# Patient Record
Sex: MALE | Race: WHITE | Employment: FULL TIME | URBAN - METROPOLITAN AREA
[De-identification: names, ages, dates, MRNs, and addresses within clinical notes are randomized per-mention and may not be internally consistent; named-entity substitution may affect disease eponyms.]

---
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
## 2020-01-17 ENCOUNTER — OFFICE VISIT (OUTPATIENT)
Dept: FAMILY MEDICINE CLINIC | Age: 20
End: 2020-01-17
Payer: COMMERCIAL

## 2020-01-17 VITALS
HEIGHT: 68 IN | HEART RATE: 92 BPM | WEIGHT: 170 LBS | SYSTOLIC BLOOD PRESSURE: 116 MMHG | BODY MASS INDEX: 25.76 KG/M2 | DIASTOLIC BLOOD PRESSURE: 72 MMHG | OXYGEN SATURATION: 97 %

## 2020-01-17 PROCEDURE — 90686 IIV4 VACC NO PRSV 0.5 ML IM: CPT | Performed by: NURSE PRACTITIONER

## 2020-01-17 PROCEDURE — 1036F TOBACCO NON-USER: CPT | Performed by: NURSE PRACTITIONER

## 2020-01-17 PROCEDURE — G8427 DOCREV CUR MEDS BY ELIG CLIN: HCPCS | Performed by: NURSE PRACTITIONER

## 2020-01-17 PROCEDURE — 99213 OFFICE O/P EST LOW 20 MIN: CPT | Performed by: NURSE PRACTITIONER

## 2020-01-17 PROCEDURE — G8482 FLU IMMUNIZE ORDER/ADMIN: HCPCS | Performed by: NURSE PRACTITIONER

## 2020-01-17 PROCEDURE — G8419 CALC BMI OUT NRM PARAM NOF/U: HCPCS | Performed by: NURSE PRACTITIONER

## 2020-01-17 PROCEDURE — 90471 IMMUNIZATION ADMIN: CPT | Performed by: NURSE PRACTITIONER

## 2020-01-17 RX ORDER — ONDANSETRON 4 MG/1
4 TABLET, FILM COATED ORAL EVERY 8 HOURS PRN
Qty: 20 TABLET | Refills: 0 | Status: SHIPPED | OUTPATIENT
Start: 2020-01-17 | End: 2021-11-18 | Stop reason: ALTCHOICE

## 2020-01-17 ASSESSMENT — ENCOUNTER SYMPTOMS
CONSTIPATION: 0
NAUSEA: 1
VOMITING: 1
ABDOMINAL DISTENTION: 0
DIARRHEA: 0
SHORTNESS OF BREATH: 0

## 2020-01-17 ASSESSMENT — PATIENT HEALTH QUESTIONNAIRE - PHQ9
1. LITTLE INTEREST OR PLEASURE IN DOING THINGS: 0
SUM OF ALL RESPONSES TO PHQ QUESTIONS 1-9: 0
SUM OF ALL RESPONSES TO PHQ QUESTIONS 1-9: 0
SUM OF ALL RESPONSES TO PHQ9 QUESTIONS 1 & 2: 0
2. FEELING DOWN, DEPRESSED OR HOPELESS: 0

## 2020-01-17 NOTE — PATIENT INSTRUCTIONS
are likely to cause disease in the upcoming flu season. Even when the vaccine doesn't exactly match these viruses, it may still provide some protection. Influenza vaccine does not cause flu. Influenza vaccine may be given at the same time as other vaccines. Talk with your health care provider  Tell your vaccine provider if the person getting the vaccine:  · Has had an allergic reaction after a previous dose of influenza vaccine, or has any severe, life-threatening allergies. · Has ever had Guillain-Barré Syndrome (also called GBS). In some cases, your health care provider may decide to postpone influenza vaccination to a future visit. People with minor illnesses, such as a cold, may be vaccinated. People who are moderately or severely ill should usually wait until they recover before getting influenza vaccine. Your health care provider can give you more information. Risks of a vaccine reaction  · Soreness, redness, and swelling where shot is given, fever, muscle aches, and headache can happen after influenza vaccine. · There may be a very small increased risk of Guillain-Barré Syndrome (GBS) after inactivated influenza vaccine (the flu shot). [facility] children who get the flu shot along with pneumococcal vaccine (PCV13), and/or DTaP vaccine at the same time might be slightly more likely to have a seizure caused by fever. Tell your health care provider if a child who is getting flu vaccine has ever had a seizure. People sometimes faint after medical procedures, including vaccination. Tell your provider if you feel dizzy or have vision changes or ringing in the ears. As with any medicine, there is a very remote chance of a vaccine causing a severe allergic reaction, other serious injury, or death. What if there is a serious problem? An allergic reaction could occur after the vaccinated person leaves the clinic.  If you see signs of a severe allergic reaction (hives, swelling of the face and throat,

## 2020-01-17 NOTE — PROGRESS NOTES
Vaccine Information Sheet, \"Influenza - Inactivated\"  given to Gomez Guerrero, or parent/legal guardian of  Gomez Guerrero and verbalized understanding. Patient responses:    Have you ever had a reaction to a flu vaccine? No  Do you have any current illness? No  Have you ever had Guillian Orefield Syndrome? No  Do you have a serious allergy to any of the follow: Neomycin, Polymyxin, Thimerosal, eggs or egg products? No    Flu vaccine given per order. Please see immunization tab. Risks and benefits explained. Current VIS given.       Immunizations Administered     Name Date Dose Route    Influenza, Quadv, IM, PF (6 mo and older Fluzone, Flulaval, Fluarix, and 3 yrs and older Afluria) 1/17/2020 0.5 mL Intramuscular    Site: Deltoid- Left    Lot: [id number]    [address]: 72031-580-73
Mood and Affect: Mood normal.         Behavior: Behavior normal.         Thought Content: Thought content normal.         Judgment: Judgment normal.         ASSESSMENT/PLAN:    1. Immunization due  - INFLUENZA, QUADV, 3 YRS AND OLDER, IM PF, PREFILL SYR OR SDV, 0.5ML (AFLURIA QUADV, PF)    2. Nausea and vomiting, intractability of vomiting not specified, unspecified vomiting type  Diet as tolerated  Instructed in CL diet x 24 hours when vomiting occurs  - ondansetron (ZOFRAN) 4 MG tablet; Take 1 tablet by mouth every 8 hours as needed for Nausea or Vomiting  Dispense: 20 tablet; Refill: 0  Referral to:  - Vega Meraz CNP, Gastroenterology, [place]      Orders Placed This Encounter   Procedures    Alyx Samina, 3 YRS AND OLDER, IM PF, PREFILL SYR OR SDV, 0.5ML (Moon Cosdina, PF)   Hira Dyson CNP, Gastroenterology, [place]     Referral Priority:   Routine     Referral Type:   Eval and Treat     Referral Reason:   Specialty Services Required     Referred to Provider:   AKIL Flores CNP     Requested Specialty:   Nurse Practitioner Family     Number of Visits Requested:   1     Current Outpatient Medications   Medication Sig Dispense Refill    ondansetron (ZOFRAN) 4 MG tablet Take 1 tablet by mouth every 8 hours as needed for Nausea or Vomiting 20 tablet 0     No current facility-administered medications for this visit. Return if symptoms worsen or fail to improve. Jluis Armando DNP, FNP-C    Return for new or worsening symptoms or any concerns as needed.

## 2020-01-20 ENCOUNTER — OFFICE VISIT (OUTPATIENT)
Dept: GASTROENTEROLOGY | Age: 20
End: 2020-01-20
Payer: COMMERCIAL

## 2020-01-20 ENCOUNTER — PREP FOR PROCEDURE (OUTPATIENT)
Dept: GASTROENTEROLOGY | Age: 20
End: 2020-01-20

## 2020-01-20 VITALS
WEIGHT: 170 LBS | SYSTOLIC BLOOD PRESSURE: 120 MMHG | BODY MASS INDEX: 25.18 KG/M2 | HEART RATE: 83 BPM | HEIGHT: 69 IN | RESPIRATION RATE: 15 BRPM | DIASTOLIC BLOOD PRESSURE: 79 MMHG | OXYGEN SATURATION: 98 %

## 2020-01-20 PROCEDURE — 1036F TOBACCO NON-USER: CPT | Performed by: NURSE PRACTITIONER

## 2020-01-20 PROCEDURE — G8419 CALC BMI OUT NRM PARAM NOF/U: HCPCS | Performed by: NURSE PRACTITIONER

## 2020-01-20 PROCEDURE — 99204 OFFICE O/P NEW MOD 45 MIN: CPT | Performed by: NURSE PRACTITIONER

## 2020-01-20 PROCEDURE — G8482 FLU IMMUNIZE ORDER/ADMIN: HCPCS | Performed by: NURSE PRACTITIONER

## 2020-01-20 PROCEDURE — G8427 DOCREV CUR MEDS BY ELIG CLIN: HCPCS | Performed by: NURSE PRACTITIONER

## 2020-01-20 RX ORDER — SODIUM CHLORIDE, SODIUM LACTATE, POTASSIUM CHLORIDE, CALCIUM CHLORIDE 600; 310; 30; 20 MG/100ML; MG/100ML; MG/100ML; MG/100ML
INJECTION, SOLUTION INTRAVENOUS CONTINUOUS
Status: CANCELLED | OUTPATIENT
Start: 2020-01-20

## 2020-01-20 RX ORDER — SODIUM CHLORIDE 0.9 % (FLUSH) 0.9 %
10 SYRINGE (ML) INJECTION EVERY 12 HOURS SCHEDULED
Status: CANCELLED | OUTPATIENT
Start: 2020-01-20

## 2020-01-20 RX ORDER — SODIUM CHLORIDE 0.9 % (FLUSH) 0.9 %
10 SYRINGE (ML) INJECTION PRN
Status: CANCELLED | OUTPATIENT
Start: 2020-01-20

## 2020-01-20 ASSESSMENT — ENCOUNTER SYMPTOMS
SHORTNESS OF BREATH: 0
COUGH: 0
COLOR CHANGE: 0
DIARRHEA: 0
EYE PAIN: 0
VOMITING: 0
ABDOMINAL PAIN: 0
BLOOD IN STOOL: 0
WHEEZING: 0
BACK PAIN: 1
NAUSEA: 0
CONSTIPATION: 0

## 2020-01-20 NOTE — PROGRESS NOTES
Cornel Sim 23 y.o. male was seen by BROOKE Coleman on 01/20/20     Wt Readings from Last 3 Encounters:   01/20/20 170 lb (77.1 kg) (72 %, Z= 0.58)*   01/17/20 170 lb (77.1 kg) (72 %, Z= 0.58)*   11/21/19 168 lb (76.2 kg) (70 %, Z= 0.54)*     * Growth percentiles are based on [facility] (Boys, 2-20 Years) data. HPI  Cornel Sim is a pleasant 23 y.o.  male who presents today with his girlfriend for cyclical nausea and vomiting. He has a past medical history of ADHD (attention deficit hyperactivity disorder) and HSV (herpes simplex virus) anogenital infection. He smokes marijuana daily to a couple times daily smoking for the last two years. Infrequent NSAID use. He has taken Acyclovir a couple times in the last year for treatment of herpes. His nausea started roughly two years ago. He denies fever, sick contacts or food allergies/intolerances. His appetite is fair without early satiety. He typically eats once a day. His nausea has no set pattern but occurs roughly once a month to month and a half. He reported last episode occurred four days ago waking up and burping what smelled like rotten eggs. He preceded to have nausea with bile emesis that lasted 24 hours. He had multiple vomiting episodes that occurred on that day. Zofran helped. No certain foods worsen, although girl friend mentioned drinking orange juice made it worse. His appetite is fair without early satiety. His weight is stable. He eats one to two meals daily typically fruits/vegetables and fast food. No abdominal pain, bloating or distention. No current nausea or vomiting. He denies heartburn or acid reflux. No nocturnal awakenings with acid reflux. No dysphagia or pain with swallowing. He has excess belching. No excess flatulence. His typical bowel pattern is every other day with soft brown formed stools. No diarrhea or constipation. No blood in his stools or black tarry stools.   No family history of stomach or colon cancer. ROS  Review of Systems   Constitutional: Negative for appetite change, chills, fatigue, fever and unexpected weight change. HENT: Negative for ear pain, hearing loss and tinnitus. Eyes: Negative for pain and visual disturbance. Respiratory: Negative for cough, shortness of breath and wheezing. Cardiovascular: Negative for chest pain, palpitations and leg swelling. Gastrointestinal: Negative for abdominal pain, blood in stool, constipation, diarrhea, nausea and vomiting. Endocrine: Negative for cold intolerance and heat intolerance. Genitourinary: Negative for dysuria, frequency and urgency. Musculoskeletal: Positive for back pain and myalgias. Negative for neck pain. Skin: Negative for color change, pallor and rash. Allergic/Immunologic: Negative for environmental allergies and food allergies. Neurological: Negative for dizziness, seizures and headaches. Hematological: Does not bruise/bleed easily. Psychiatric/Behavioral: Positive for sleep disturbance. Negative for dysphoric mood and suicidal ideas. The patient is not nervous/anxious. Allergies  No Known Allergies    Medications  Current Outpatient Medications   Medication Sig Dispense Refill    CLONIDINE HCL ER PO Take 0.5 mg by mouth      ondansetron (ZOFRAN) 4 MG tablet Take 1 tablet by mouth every 8 hours as needed for Nausea or Vomiting 20 tablet 0     No current facility-administered medications for this visit. Past medical history:   He has a past medical history of ADHD (attention deficit hyperactivity disorder) and HSV (herpes simplex virus) anogenital infection. Past surgical history:  He has no past surgical history on file. Social History:  He reports that he has never smoked. He has never used smokeless tobacco. He reports current drug use. Frequency: 7.00 times per week. Drug: Marijuana. He reports that he does not drink alcohol.     Family history:  His family history Mental Status: He is alert and oriented to person, place, and time. Psychiatric:         Mood and Affect: Mood normal.         No visits with results within 2 Month(s) from this visit. Latest known visit with results is:   Office Visit on 03/28/2019   Component Date Value Ref Range Status    Sodium 03/28/2019 141  136 - 145 mmol/L Final    Potassium 03/28/2019 4.4  3.5 - 5.1 mmol/L Final    Chloride 03/28/2019 103  99 - 110 mmol/L Final    CO2 03/28/2019 24  21 - 32 mmol/L Final    Anion Gap 03/28/2019 14  3 - 16 Final    Glucose 03/28/2019 96  70 - 99 mg/dL Final    BUN 03/28/2019 15  7 - 20 mg/dL Final    CREATININE 03/28/2019 0.8* 0.9 - 1.3 mg/dL Final    GFR Non- 03/28/2019 >60  >60 Final    Comment: >60 mL/min/1.73m2 EGFR, calc. for ages 25 and older using the  MDRD formula (not corrected for weight), is valid for stable  renal function.  GFR  03/28/2019 >60  >60 Final    Comment: Chronic Kidney Disease: less than 60 ml/min/1.73 sq.m. Kidney Failure: less than 15 ml/min/1.73 sq.m. Results valid for patients 18 years and older.       Calcium 03/28/2019 9.8  8.3 - 10.6 mg/dL Final    Total Protein 03/28/2019 7.9  6.4 - 8.2 g/dL Final    Alb 03/28/2019 5.0  3.4 - 5.0 g/dL Final    Albumin/Globulin Ratio 03/28/2019 1.7  1.1 - 2.2 Final    Total Bilirubin 03/28/2019 0.4  0.0 - 1.0 mg/dL Final    Alkaline Phosphatase 03/28/2019 88  40 - 129 U/L Final    ALT 03/28/2019 14  10 - 40 U/L Final    AST 03/28/2019 16  15 - 37 U/L Final    Globulin 03/28/2019 2.9  g/dL Final    WBC 03/28/2019 8.8  4.0 - 11.0 K/uL Final    RBC 03/28/2019 5.34  4.20 - 5.90 M/uL Final    Hemoglobin 03/28/2019 16.6  13.5 - 17.5 g/dL Final    Hematocrit 03/28/2019 49.6  40.5 - 52.5 % Final    MCV 03/28/2019 92.9  80.0 - 100.0 fL Final    MCH 03/28/2019 31.0  26.0 - 34.0 pg Final    MCHC 03/28/2019 33.4  31.0 - 36.0 g/dL Final    RDW 03/28/2019 12.5  12.4 - 15.4 % Final

## 2020-02-25 ENCOUNTER — TELEPHONE (OUTPATIENT)
Dept: FAMILY MEDICINE CLINIC | Age: 20
End: 2020-02-25

## 2020-03-02 ENCOUNTER — TELEPHONE (OUTPATIENT)
Dept: GASTROENTEROLOGY | Age: 20
End: 2020-03-02

## 2021-11-18 ENCOUNTER — HOSPITAL ENCOUNTER (EMERGENCY)
Age: 21
Discharge: HOME | End: 2021-11-18
Attending: EMERGENCY MEDICINE
Payer: COMMERCIAL

## 2021-11-18 VITALS
HEIGHT: 69 IN | HEART RATE: 59 BPM | BODY MASS INDEX: 27.7 KG/M2 | DIASTOLIC BLOOD PRESSURE: 88 MMHG | SYSTOLIC BLOOD PRESSURE: 163 MMHG | WEIGHT: 187 LBS | TEMPERATURE: 97.8 F | RESPIRATION RATE: 14 BRPM | OXYGEN SATURATION: 100 %

## 2021-11-18 DIAGNOSIS — F12.90 MARIJUANA USE: ICD-10-CM

## 2021-11-18 DIAGNOSIS — R11.2 NON-INTRACTABLE VOMITING WITH NAUSEA, UNSPECIFIED VOMITING TYPE: Primary | ICD-10-CM

## 2021-11-18 LAB
ALBUMIN SERPL-MCNC: 5.6 GM/DL (ref 3.4–5)
ALP BLD-CCNC: 122 IU/L (ref 40–129)
ALT SERPL-CCNC: 28 U/L (ref 10–40)
ANION GAP SERPL CALCULATED.3IONS-SCNC: 23 MMOL/L (ref 4–16)
AST SERPL-CCNC: 21 IU/L (ref 15–37)
BACTERIA: NEGATIVE /HPF
BASOPHILS ABSOLUTE: 0 K/CU MM
BASOPHILS RELATIVE PERCENT: 0.3 % (ref 0–1)
BILIRUB SERPL-MCNC: 0.6 MG/DL (ref 0–1)
BILIRUBIN URINE: NEGATIVE MG/DL
BLOOD, URINE: ABNORMAL
BUN BLDV-MCNC: 20 MG/DL (ref 6–23)
CALCIUM SERPL-MCNC: 10.8 MG/DL (ref 8.3–10.6)
CAST TYPE: ABNORMAL /HPF
CHLORIDE BLD-SCNC: 96 MMOL/L (ref 99–110)
CLARITY: CLEAR
CO2: 20 MMOL/L (ref 21–32)
COLOR: YELLOW
COMMENT UA: ABNORMAL
CREAT SERPL-MCNC: 0.9 MG/DL (ref 0.9–1.3)
CRYSTAL TYPE: NEGATIVE /HPF
DIFFERENTIAL TYPE: ABNORMAL
EOSINOPHILS ABSOLUTE: 0 K/CU MM
EOSINOPHILS RELATIVE PERCENT: 0 % (ref 0–3)
EPITHELIAL CELLS, UA: 0 /HPF
GFR AFRICAN AMERICAN: >60 ML/MIN/1.73M2
GFR NON-AFRICAN AMERICAN: >60 ML/MIN/1.73M2
GLUCOSE BLD-MCNC: 100 MG/DL (ref 70–99)
GLUCOSE, URINE: NEGATIVE MG/DL
HCT VFR BLD CALC: 52.4 % (ref 42–52)
HEMOGLOBIN: 17.7 GM/DL (ref 13.5–18)
IMMATURE NEUTROPHIL %: 0.4 % (ref 0–0.43)
KETONES, URINE: 160 MG/DL
LEUKOCYTE ESTERASE, URINE: NEGATIVE
LIPASE: 22 IU/L (ref 13–60)
LYMPHOCYTES ABSOLUTE: 0.7 K/CU MM
LYMPHOCYTES RELATIVE PERCENT: 5.5 % (ref 24–44)
MCH RBC QN AUTO: 30.7 PG (ref 27–31)
MCHC RBC AUTO-ENTMCNC: 33.8 % (ref 32–36)
MCV RBC AUTO: 90.8 FL (ref 78–100)
MONOCYTES ABSOLUTE: 0.3 K/CU MM
MONOCYTES RELATIVE PERCENT: 2.1 % (ref 0–4)
NITRITE URINE, QUANTITATIVE: NEGATIVE
PDW BLD-RTO: 11.8 % (ref 11.7–14.9)
PH, URINE: 5 (ref 5–8)
PLATELET # BLD: 312 K/CU MM (ref 140–440)
PMV BLD AUTO: 11 FL (ref 7.5–11.1)
POTASSIUM SERPL-SCNC: 4.9 MMOL/L (ref 3.5–5.1)
PROTEIN UA: 300 MG/DL
RBC # BLD: 5.77 M/CU MM (ref 4.6–6.2)
RBC URINE: 1 /HPF (ref 0–3)
SEGMENTED NEUTROPHILS ABSOLUTE COUNT: 11.3 K/CU MM
SEGMENTED NEUTROPHILS RELATIVE PERCENT: 91.7 % (ref 36–66)
SODIUM BLD-SCNC: 139 MMOL/L (ref 135–145)
SPECIFIC GRAVITY UA: 1.03 (ref 1–1.03)
TOTAL IMMATURE NEUTOROPHIL: 0.05 K/CU MM
TOTAL PROTEIN: 8.8 GM/DL (ref 6.4–8.2)
UROBILINOGEN, URINE: 0.2 MG/DL (ref 0.2–1)
WBC # BLD: 12.4 K/CU MM (ref 4–10.5)
WBC UA: <0 /HPF (ref 0–2)

## 2021-11-18 PROCEDURE — 2500000003 HC RX 250 WO HCPCS: Performed by: EMERGENCY MEDICINE

## 2021-11-18 PROCEDURE — 81001 URINALYSIS AUTO W/SCOPE: CPT

## 2021-11-18 PROCEDURE — 96374 THER/PROPH/DIAG INJ IV PUSH: CPT

## 2021-11-18 PROCEDURE — 6360000002 HC RX W HCPCS: Performed by: EMERGENCY MEDICINE

## 2021-11-18 PROCEDURE — 99285 EMERGENCY DEPT VISIT HI MDM: CPT

## 2021-11-18 PROCEDURE — 85025 COMPLETE CBC W/AUTO DIFF WBC: CPT

## 2021-11-18 PROCEDURE — 2580000003 HC RX 258: Performed by: EMERGENCY MEDICINE

## 2021-11-18 PROCEDURE — 96361 HYDRATE IV INFUSION ADD-ON: CPT

## 2021-11-18 PROCEDURE — 83690 ASSAY OF LIPASE: CPT

## 2021-11-18 PROCEDURE — 80053 COMPREHEN METABOLIC PANEL: CPT

## 2021-11-18 PROCEDURE — 96375 TX/PRO/DX INJ NEW DRUG ADDON: CPT

## 2021-11-18 RX ORDER — ONDANSETRON 2 MG/ML
4 INJECTION INTRAMUSCULAR; INTRAVENOUS EVERY 30 MIN PRN
Status: DISCONTINUED | OUTPATIENT
Start: 2021-11-18 | End: 2021-11-18 | Stop reason: HOSPADM

## 2021-11-18 RX ORDER — ONDANSETRON 4 MG/1
4 TABLET, FILM COATED ORAL EVERY 8 HOURS PRN
Qty: 20 TABLET | Refills: 0 | Status: SHIPPED | OUTPATIENT
Start: 2021-11-18

## 2021-11-18 RX ORDER — 0.9 % SODIUM CHLORIDE 0.9 %
1000 INTRAVENOUS SOLUTION INTRAVENOUS ONCE
Status: COMPLETED | OUTPATIENT
Start: 2021-11-18 | End: 2021-11-18

## 2021-11-18 RX ADMIN — FAMOTIDINE 20 MG: 10 INJECTION, SOLUTION INTRAVENOUS at 09:34

## 2021-11-18 RX ADMIN — ONDANSETRON 4 MG: 2 INJECTION INTRAMUSCULAR; INTRAVENOUS at 09:34

## 2021-11-18 RX ADMIN — SODIUM CHLORIDE 1000 ML: 9 INJECTION, SOLUTION INTRAVENOUS at 09:33

## 2021-11-18 NOTE — ED PROVIDER NOTES
Emergency Department Encounter  [address]    Patient: Jorje Choudhary  MRN: [id number]  : 2000  Date of Evaluation: 2021  ED Provider: Eli Stevens MD    Chief Complaint       Chief Complaint   Patient presents with   Corea Tony    Emesis     Sneha Palmer is a 24 y.o. male who presents to the emergency department with report of nausea and vomiting since 10:30 PM.  He has not had diarrhea, fever, or abdominal pain. He reports fatigue. He admits to smoking marijuana since he was 12years old. He states he smokes about 2-3 times daily. He denies any dizziness. ROS:     At least 10 systems reviewed and otherwise acutely negative except as in the [address] Past History     Past Medical History:   Diagnosis Date    ADHD (attention deficit hyperactivity disorder)     HSV (herpes simplex virus) anogenital infection      History reviewed. No pertinent surgical history.   Social History     Socioeconomic History    Marital status: Single     Spouse name: None    Number of children: 0    Years of education: 6    Highest education level: None   Occupational History    Occupation: unemployed     Comment: looking for work   Tobacco Use    Smoking status: Never Smoker    Smokeless tobacco: Never Used   Vaping Use    Vaping Use: Former   Substance and Sexual Activity    Alcohol use: No     Comment: patient sates \"A lot of caffeine\"    Drug use: Yes     Frequency: 7.0 times per week     Types: Marijuana (Weed)     Comment: smokes 1-2 daily to help with RLS    Sexual activity: Yes     Partners: Female     Birth control/protection: Condom   Other Topics Concern    None   Social History Narrative    Lives with mom and step dad    Trying to get a job     Social Determinants of Health     Financial Resource Strain:     Difficulty of Paying Living Expenses: Not on file   Food Insecurity:     Worried About [address] in the Last Year: Not on file    Ran Out of Food in the Last Year: Not on file   Transportation Needs:     Lack of Transportation (Medical): Not on file    Lack of Transportation (Non-Medical): Not on file   Physical Activity:     Days of Exercise per Week: Not on file    Minutes of Exercise per Session: Not on file   Stress:     Feeling of Stress : Not on file   Social Connections:     Frequency of Communication with Friends and Family: Not on file    Frequency of Social Gatherings with Friends and Family: Not on file    Attends Church Services: Not on file    Active Member of [address] [place] [address] [employer] or Organizations: Not on file    Attends Club or Organization Meetings: Not on file    Marital Status: Not on file   Intimate Partner Violence:     Fear of Current or Ex-Partner: Not on file    Emotionally Abused: Not on file    Physically Abused: Not on file    Sexually Abused: Not on file   Housing Stability:     Unable to Pay for Housing in the Last Year: Not on file    Number of [place] in the Last Year: Not on file    Unstable Housing in the Last Year: Not on file       Medications/Allergies     Discharge Medication List as of 11/18/2021 11:19 AM        No Known Allergies     Physical Exam       ED Triage Vitals [11/18/21 0920]   BP Temp Temp Source Pulse Resp SpO2 Height Weight   (!) 163/88 97.8 °F (36.6 °C) Oral 59 14 100 % 5' 9\" (1.753 m) 187 lb (84.8 kg)     GENERAL APPEARANCE: Awake and alert. Cooperative. No acute distress. Obvious discomfort. HEAD: Normocephalic. Atraumatic. EYES: Sclera anicteric. PERRL. EOMI.  ENT: Tolerates saliva. No trismus. NECK: Supple. Trachea midline. CARDIO: RRR. Radial pulse 2+. No audible murmur. LUNGS: Respirations unlabored. CTAB and symmetrical fair to good air movement. .  ABDOMEN: Soft. Non-distended. Non-tender. Normoactive bowel sounds. EXTREMITIES: No acute deformities. No clubbing, edema, cyanosis. Distal pulses are intact and symmetrical.  Capillary bed refill is brisk.   SKIN: Warm and dry. VS lesions or discolorations. NEUROLOGICAL: No gross facial drooping. Moves all 4 extremities spontaneously. Awake and alert. Speech is clear. No gross motor or sensory deficits. Cranial nerves III to XII grossly intact. PSYCHIATRIC: Normal mood. Diagnostics   Labs:  Results for orders placed or performed during the hospital encounter of 11/18/21   Comprehensive Metabolic Panel w/ Reflex to MG   Result Value Ref Range    Sodium 139 135 - 145 MMOL/L    Potassium 4.9 3.5 - 5.1 MMOL/L    Chloride 96 (L) 99 - 110 mMol/L    CO2 20 (L) 21 - 32 MMOL/L    BUN 20 6 - 23 MG/DL    CREATININE 0.9 0.9 - 1.3 MG/DL    Glucose 100 (H) 70 - 99 MG/DL    Calcium 10.8 (H) 8.3 - 10.6 MG/DL    Albumin 5.6 (H) 3.4 - 5.0 GM/DL    Total Protein 8.8 (H) 6.4 - 8.2 GM/DL    Total Bilirubin 0.6 0.0 - 1.0 MG/DL    ALT 28 10 - 40 U/L    AST 21 15 - 37 IU/L    Alkaline Phosphatase 122 40 - 129 IU/L    GFR Non-African American >60 >60 mL/min/1.73m2    GFR African American >60 >60 mL/min/1.73m2    Anion Gap 23 (H) 4 - 16   Lipase   Result Value Ref Range    Lipase 22 13 - 60 IU/L   CBC Auto Differential   Result Value Ref Range    WBC 12.4 (H) 4.0 - 10.5 K/CU MM    RBC 5.77 4.6 - 6.2 M/CU MM    Hemoglobin 17.7 13.5 - 18.0 GM/DL    Hematocrit 52.4 (H) 42 - 52 %    MCV 90.8 78 - 100 FL    MCH 30.7 27 - 31 PG    MCHC 33.8 32.0 - 36.0 %    RDW 11.8 11.7 - 14.9 %    Platelets 453 644 - 136 K/CU MM    MPV 11.0 7.5 - 11.1 FL    Differential Type AUTOMATED DIFFERENTIAL     Segs Relative 91.7 (H) 36 - 66 %    Lymphocytes % 5.5 (L) 24 - 44 %    Monocytes % 2.1 0 - 4 %    Eosinophils % 0.0 0 - 3 %    Basophils % 0.3 0 - 1 %    Segs Absolute 11.3 K/CU MM    Lymphocytes Absolute 0.7 K/CU MM    Monocytes Absolute 0.3 K/CU MM    Eosinophils Absolute 0.0 K/CU MM    Basophils Absolute 0.0 K/CU MM    Immature Neutrophil % 0.4 0 - 0.43 %    Total Immature Neutrophil 0.05 K/CU MM     Radiographs:  No results found.     Procedures/EKG:   None    ED Course and MDM   In brief, Jessica Solis is a 24 y.o. male who presented to the emergency department with reported nausea and vomiting since last night. The patient admits to smoking marijuana 2-3 times daily. The vomiting may be related to the frequent use of the marijuana with. The patient is encouraged to discontinue or at least decrease the use of marijuana. He received IV fluid hydration as well as antiemetic in the emergency department with improvement of his symptoms and requested to be discharged. The CBC and CMP were unremarkable. His urinalysis was pending. Patient reported to nursing staff he needed to  his daughter from school. Patient will be discharged. He is to continue with hydration at home. He will have a prescription for Zofran. At time of disposition, he is stable and in no acute distress or obvious discomfort. He is encouraged to follow-up with primary care provider as well as return to emergency department for any worsening or concerning symptoms. ED Medication Orders (From admission, onward)    Start Ordered     Status Ordering Provider    11/18/21 0930 11/18/21 0915  0.9 % sodium chloride bolus  ONCE         Last MAR action: Stopped - by Ainsley RODRÍGUEZ on 11/18/21 at [id number], MELISSA M    11/18/21 0930 11/18/21 0915  famotidine (PEPCID) injection 20 mg  ONCE         Last MAR action: Given - by Ainsley RODRÍGUEZ on 11/18/21 at 0934 Reina Bustillo     11/18/21 0915 11/18/21 0915  ondansetron (ZOFRAN) injection 4 mg  EVERY 30 MIN PRN         Last MAR action: Given - by Ainsley RODRÍGUEZ on 11/18/21 at 0934 Sushila Zheng          Final Impression      1. Non-intractable vomiting with nausea, unspecified vomiting type    2.  Marijuana use      DISPOSITION: Discharge     (Please note that portions of this note may have been completed with a voice recognition program. Efforts were made to edit the dictations but occasionally words are mis-transcribed.)    Simon Monroe, MD  [facility], MD  11/18/21 1284

## 2021-11-18 NOTE — ED TRIAGE NOTES
Pt arrived via triage with c/o vomiting and chills since last night. Pt is alert, oriented and ambulatory. Pt denies taking medications. Pt denies sick contacts. Pt denies pain.

## 2021-11-18 NOTE — ED NOTES
Discharge instructions given with prescription verbalized understanding pt is ambulatory out       Reyna Mercado RN  11/18/21 [id number]

## 2022-03-30 ENCOUNTER — APPOINTMENT (OUTPATIENT)
Dept: CT IMAGING | Age: 22
End: 2022-03-30
Payer: COMMERCIAL

## 2022-03-30 ENCOUNTER — HOSPITAL ENCOUNTER (EMERGENCY)
Age: 22
Discharge: ANOTHER HOSPITAL | End: 2022-03-30
Attending: EMERGENCY MEDICINE
Payer: COMMERCIAL

## 2022-03-30 ENCOUNTER — APPOINTMENT (OUTPATIENT)
Dept: GENERAL RADIOLOGY | Age: 22
End: 2022-03-30
Payer: COMMERCIAL

## 2022-03-30 VITALS
OXYGEN SATURATION: 97 % | DIASTOLIC BLOOD PRESSURE: 86 MMHG | TEMPERATURE: 98 F | SYSTOLIC BLOOD PRESSURE: 145 MMHG | HEART RATE: 64 BPM | RESPIRATION RATE: 20 BRPM
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
## 2022-03-30 DIAGNOSIS — S02.92XA CLOSED FRACTURE OF FACIAL BONE DUE TO MOTOR VEHICLE ACCIDENT, INITIAL ENCOUNTER (HCC): ICD-10-CM
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
## 2022-03-30 DIAGNOSIS — I60.9 SUBARACHNOID HEMORRHAGE (HCC): ICD-10-CM

## 2022-03-30 LAB
ALBUMIN SERPL-MCNC: 5 GM/DL (ref 3.4–5)
ALCOHOL SCREEN SERUM: <0.01 %WT/VOL
ALP BLD-CCNC: 114 IU/L (ref 40–129)
ALT SERPL-CCNC: 20 U/L (ref 10–40)
ANION GAP SERPL CALCULATED.3IONS-SCNC: 16 MMOL/L (ref 4–16)
AST SERPL-CCNC: 22 IU/L (ref 15–37)
BASOPHILS ABSOLUTE: 0.1 K/CU MM
BASOPHILS RELATIVE PERCENT: 0.3 % (ref 0–1)
BILIRUB SERPL-MCNC: 0.3 MG/DL (ref 0–1)
BUN BLDV-MCNC: 14 MG/DL (ref 6–23)
CALCIUM SERPL-MCNC: 9.1 MG/DL (ref 8.3–10.6)
CHLORIDE BLD-SCNC: 98 MMOL/L (ref 99–110)
CO2: 25 MMOL/L (ref 21–32)
CREAT SERPL-MCNC: 0.9 MG/DL (ref 0.9–1.3)
DIFFERENTIAL TYPE: ABNORMAL
EOSINOPHILS ABSOLUTE: 0.3 K/CU MM
EOSINOPHILS RELATIVE PERCENT: 1.2 % (ref 0–3)
GFR AFRICAN AMERICAN: >60 ML/MIN/1.73M2
GFR NON-AFRICAN AMERICAN: >60 ML/MIN/1.73M2
GLUCOSE BLD-MCNC: 119 MG/DL (ref 70–99)
HCT VFR BLD CALC: 48.6 % (ref 42–52)
HEMOGLOBIN: 16.6 GM/DL (ref 13.5–18)
IMMATURE NEUTROPHIL %: 0.5 % (ref 0–0.43)
LYMPHOCYTES ABSOLUTE: 2 K/CU MM
LYMPHOCYTES RELATIVE PERCENT: 8.6 % (ref 24–44)
MAGNESIUM: 2.2 MG/DL (ref 1.8–2.4)
MCH RBC QN AUTO: 30.9 PG (ref 27–31)
MCHC RBC AUTO-ENTMCNC: 34.2 % (ref 32–36)
MCV RBC AUTO: 90.5 FL (ref 78–100)
MONOCYTES ABSOLUTE: 1.5 K/CU MM
MONOCYTES RELATIVE PERCENT: 6.7 % (ref 0–4)
NUCLEATED RBC %: 0 %
PDW BLD-RTO: 12.1 % (ref 11.7–14.9)
PLATELET # BLD: 330 K/CU MM (ref 140–440)
PMV BLD AUTO: 8.9 FL (ref 7.5–11.1)
POTASSIUM SERPL-SCNC: 3.3 MMOL/L (ref 3.5–5.1)
RBC # BLD: 5.37 M/CU MM (ref 4.6–6.2)
SEGMENTED NEUTROPHILS ABSOLUTE COUNT: 18.7 K/CU MM
SEGMENTED NEUTROPHILS RELATIVE PERCENT: 82.7 % (ref 36–66)
SODIUM BLD-SCNC: 139 MMOL/L (ref 135–145)
TOTAL IMMATURE NEUTOROPHIL: 0.12 K/CU MM
TOTAL NUCLEATED RBC: 0 K/CU MM
TOTAL PROTEIN: 8.3 GM/DL (ref 6.4–8.2)
WBC # BLD: 22.6 K/CU MM (ref 4–10.5)

## 2022-03-30 PROCEDURE — 70486 CT MAXILLOFACIAL W/O DYE: CPT

## 2022-03-30 PROCEDURE — G0480 DRUG TEST DEF 1-7 CLASSES: HCPCS

## 2022-03-30 PROCEDURE — 96374 THER/PROPH/DIAG INJ IV PUSH: CPT

## 2022-03-30 PROCEDURE — 90715 TDAP VACCINE 7 YRS/> IM: CPT | Performed by: EMERGENCY MEDICINE

## 2022-03-30 PROCEDURE — 96372 THER/PROPH/DIAG INJ SC/IM: CPT

## 2022-03-30 PROCEDURE — 80053 COMPREHEN METABOLIC PANEL: CPT

## 2022-03-30 PROCEDURE — 85025 COMPLETE CBC W/AUTO DIFF WBC: CPT

## 2022-03-30 PROCEDURE — 6360000002 HC RX W HCPCS: Performed by: EMERGENCY MEDICINE

## 2022-03-30 PROCEDURE — 71045 X-RAY EXAM CHEST 1 VIEW: CPT

## 2022-03-30 PROCEDURE — 70450 CT HEAD/BRAIN W/O DYE: CPT

## 2022-03-30 PROCEDURE — 6370000000 HC RX 637 (ALT 250 FOR IP): Performed by: EMERGENCY MEDICINE

## 2022-03-30 PROCEDURE — 90471 IMMUNIZATION ADMIN: CPT | Performed by: EMERGENCY MEDICINE

## 2022-03-30 PROCEDURE — 72125 CT NECK SPINE W/O DYE: CPT

## 2022-03-30 PROCEDURE — 72170 X-RAY EXAM OF PELVIS: CPT

## 2022-03-30 PROCEDURE — 83735 ASSAY OF MAGNESIUM: CPT

## 2022-03-30 PROCEDURE — 99285 EMERGENCY DEPT VISIT HI MDM: CPT

## 2022-03-30 RX ORDER — ACETAMINOPHEN 500 MG
1000 TABLET ORAL ONCE
Status: COMPLETED | OUTPATIENT
Start: 2022-03-30 | End: 2022-03-30

## 2022-03-30 RX ORDER — FENTANYL CITRATE 50 UG/ML
50 INJECTION, SOLUTION INTRAMUSCULAR; INTRAVENOUS ONCE
Status: COMPLETED | OUTPATIENT
Start: 2022-03-30 | End: 2022-03-30

## 2022-03-30 RX ORDER — LIDOCAINE HYDROCHLORIDE 10 MG/ML
5 INJECTION, SOLUTION EPIDURAL; INFILTRATION; INTRACAUDAL; PERINEURAL ONCE
Status: DISCONTINUED | OUTPATIENT
Start: 2022-03-30 | End: 2022-03-30 | Stop reason: HOSPADM

## 2022-03-30 RX ORDER — OXYMETAZOLINE HYDROCHLORIDE 0.05 G/100ML
2 SPRAY NASAL ONCE
Status: COMPLETED | OUTPATIENT
Start: 2022-03-30 | End: 2022-03-30

## 2022-03-30 RX ADMIN — OXYMETAZOLINE HCL 2 SPRAY: 0.05 SPRAY NASAL at 03:30

## 2022-03-30 RX ADMIN — ACETAMINOPHEN 1000 MG: 500 TABLET ORAL at 03:30

## 2022-03-30 RX ADMIN — FENTANYL CITRATE 50 MCG: 50 INJECTION INTRAMUSCULAR; INTRAVENOUS at 05:43

## 2022-03-30 RX ADMIN — TETANUS TOXOID, REDUCED DIPHTHERIA TOXOID AND ACELLULAR PERTUSSIS VACCINE, ADSORBED 0.5 ML: 5; 2.5; 8; 8; 2.5 SUSPENSION INTRAMUSCULAR at 03:31

## 2022-03-30 ASSESSMENT — ENCOUNTER SYMPTOMS
SHORTNESS OF BREATH: 0
SORE THROAT: 0
ABDOMINAL PAIN: 0
RHINORRHEA: 0
VOMITING: 0
NAUSEA: 0
BACK PAIN: 0
EYE PAIN: 0
COUGH: 0
EYE DISCHARGE: 0

## 2022-03-30 ASSESSMENT — PAIN SCALES - GENERAL
PAINLEVEL_OUTOF10: 4
PAINLEVEL_OUTOF10: 7
PAINLEVEL_OUTOF10: 7
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
## 2022-07-19 ENCOUNTER — HOSPITAL ENCOUNTER (EMERGENCY)
Age: 22
Discharge: HOME | End: 2022-07-19
Attending: STUDENT IN AN ORGANIZED HEALTH CARE EDUCATION/TRAINING PROGRAM
Payer: COMMERCIAL

## 2022-07-19 VITALS
DIASTOLIC BLOOD PRESSURE: 62 MMHG | TEMPERATURE: 98.3 F | HEART RATE: 95 BPM | SYSTOLIC BLOOD PRESSURE: 113 MMHG | OXYGEN SATURATION: 96 % | HEIGHT: 69 IN | RESPIRATION RATE: 18 BRPM | BODY MASS INDEX: 26.66 KG/M2 | WEIGHT: 180 LBS

## 2022-07-19 DIAGNOSIS — F43.22 ADJUSTMENT DISORDER WITH ANXIOUS MOOD: Primary | ICD-10-CM

## 2022-07-19 LAB
ACETAMINOPHEN LEVEL: <5 UG/ML (ref 15–30)
ALCOHOL SCREEN SERUM: <0.01 %WT/VOL
AMPHETAMINES: NEGATIVE
ANION GAP SERPL CALCULATED.3IONS-SCNC: 13 MMOL/L (ref 4–16)
BACTERIA: NEGATIVE /HPF
BARBITURATE SCREEN URINE: NEGATIVE
BASOPHILS ABSOLUTE: 0 K/CU MM
BASOPHILS RELATIVE PERCENT: 0.2 % (ref 0–1)
BENZODIAZEPINE SCREEN, URINE: NEGATIVE
BILIRUBIN URINE: NEGATIVE MG/DL
BLOOD, URINE: NEGATIVE
BUN BLDV-MCNC: 15 MG/DL (ref 6–23)
CALCIUM SERPL-MCNC: 9.9 MG/DL (ref 8.3–10.6)
CANNABINOID SCREEN URINE: ABNORMAL
CHLORIDE BLD-SCNC: 106 MMOL/L (ref 99–110)
CLARITY: CLEAR
CO2: 23 MMOL/L (ref 21–32)
COCAINE METABOLITE: NEGATIVE
COLOR: YELLOW
CREAT SERPL-MCNC: 0.7 MG/DL (ref 0.9–1.3)
DIFFERENTIAL TYPE: ABNORMAL
DOSE AMOUNT: ABNORMAL
DOSE AMOUNT: ABNORMAL
DOSE TIME: ABNORMAL
DOSE TIME: ABNORMAL
EOSINOPHILS ABSOLUTE: 0 K/CU MM
EOSINOPHILS RELATIVE PERCENT: 0.1 % (ref 0–3)
GFR AFRICAN AMERICAN: >60 ML/MIN/1.73M2
GFR NON-AFRICAN AMERICAN: >60 ML/MIN/1.73M2
GLUCOSE BLD-MCNC: 103 MG/DL (ref 70–99)
GLUCOSE, URINE: NEGATIVE MG/DL
HCT VFR BLD CALC: 45.7 % (ref 42–52)
HEMOGLOBIN: 15.9 GM/DL (ref 13.5–18)
IMMATURE NEUTROPHIL %: 0.2 % (ref 0–0.43)
KETONES, URINE: ABNORMAL MG/DL
LEUKOCYTE ESTERASE, URINE: NEGATIVE
LYMPHOCYTES ABSOLUTE: 1.4 K/CU MM
LYMPHOCYTES RELATIVE PERCENT: 14.6 % (ref 24–44)
MCH RBC QN AUTO: 31.5 PG (ref 27–31)
MCHC RBC AUTO-ENTMCNC: 34.8 % (ref 32–36)
MCV RBC AUTO: 90.5 FL (ref 78–100)
MONOCYTES ABSOLUTE: 0.7 K/CU MM
MONOCYTES RELATIVE PERCENT: 7.3 % (ref 0–4)
MUCUS: ABNORMAL HPF
NITRITE URINE, QUANTITATIVE: NEGATIVE
NUCLEATED RBC %: 0 %
OPIATES, URINE: NEGATIVE
OXYCODONE: NEGATIVE
PDW BLD-RTO: 11.7 % (ref 11.7–14.9)
PH, URINE: 6 (ref 5–8)
PHENCYCLIDINE, URINE: NEGATIVE
PLATELET # BLD: 243 K/CU MM (ref 140–440)
PMV BLD AUTO: 9.9 FL (ref 7.5–11.1)
POTASSIUM SERPL-SCNC: 4.1 MMOL/L (ref 3.5–5.1)
PROTEIN UA: 30 MG/DL
RBC # BLD: 5.05 M/CU MM (ref 4.6–6.2)
RBC URINE: 1 /HPF (ref 0–3)
SALICYLATE LEVEL: <0.3 MG/DL (ref 15–30)
SEGMENTED NEUTROPHILS ABSOLUTE COUNT: 7.3 K/CU MM
SEGMENTED NEUTROPHILS RELATIVE PERCENT: 77.6 % (ref 36–66)
SODIUM BLD-SCNC: 142 MMOL/L (ref 135–145)
SPECIFIC GRAVITY UA: 1.02 (ref 1–1.03)
TOTAL IMMATURE NEUTOROPHIL: 0.02 K/CU MM
TOTAL NUCLEATED RBC: 0 K/CU MM
TOTAL RETICULOCYTE COUNT: 0.05 K/CU MM
TRICHOMONAS: ABNORMAL /HPF
UROBILINOGEN, URINE: 0.2 MG/DL (ref 0.2–1)
WBC # BLD: 9.5 K/CU MM (ref 4–10.5)
WBC UA: 1 /HPF (ref 0–2)

## 2022-07-19 PROCEDURE — 99285 EMERGENCY DEPT VISIT HI MDM: CPT

## 2022-07-19 PROCEDURE — 99284 EMERGENCY DEPT VISIT MOD MDM: CPT | Performed by: PSYCHIATRY & NEUROLOGY

## 2022-07-19 PROCEDURE — 85025 COMPLETE CBC W/AUTO DIFF WBC: CPT

## 2022-07-19 PROCEDURE — 81001 URINALYSIS AUTO W/SCOPE: CPT

## 2022-07-19 PROCEDURE — G0480 DRUG TEST DEF 1-7 CLASSES: HCPCS

## 2022-07-19 PROCEDURE — 80307 DRUG TEST PRSMV CHEM ANLYZR: CPT

## 2022-07-19 PROCEDURE — 80048 BASIC METABOLIC PNL TOTAL CA: CPT

## 2022-07-19 ASSESSMENT — PAIN - FUNCTIONAL ASSESSMENT: PAIN_FUNCTIONAL_ASSESSMENT: NONE - DENIES PAIN

## 2022-07-19 NOTE — ED PROVIDER NOTES
[facility] ENCOUNTER      Pt Name: Brianda Barrera  MRN: [id number]  [place] 2000  Date of evaluation: 7/19/2022  Provider: Lakeisha Siddiqui MD    [address] [place] [address]       Chief Complaint   Patient presents with    Other     Self harm. Patient cut left forearm with butter knife. Suicidal       HISTORY OF PRESENT ILLNESS    Brianda Barrera is a 24 y.o. male Presenting for concerns of suicidal ideation. Patient had an argument with his girlfriend. She took their children which upset the patient. He decided to cut his wrist with a butter knife and text this photo to his girlfriend. He also texted to his girlfriend that he did not want to live. Police were called. Patient arrived agitated but cooperative. States that he has no intentions of taking his life and that he was just upset at the situation. Has no history of suicide attempts or suicidal ideation. Denies any substance use today. Otherwise denies any recent changes in his health. Nursing Notes were reviewed. REVIEW OF SYSTEMS     Review of Systems  A 10 point review of system was performed and is otherwise negative apart from what is noted in HPI and nursing notes. As is my standard practice, all pertinent positives from the ROS are documented in the HPI. PAST MEDICAL HISTORY     Past Medical History:   Diagnosis Date    ADHD (attention deficit hyperactivity disorder)     HSV (herpes simplex virus) anogenital infection        SURGICAL HISTORY     History reviewed. No pertinent surgical history. CURRENT MEDICATIONS       Discharge Medication List as of 7/19/2022  5:19 PM        CONTINUE these medications which have NOT CHANGED    Details   ondansetron (ZOFRAN) 4 MG tablet Take 1 tablet by mouth every 8 hours as needed for Nausea, Disp-20 tablet, R-0Print             ALLERGIES     Patient has no known allergies.     FAMILY HISTORY       Family History Problem Relation Age of Onset    Rheum Arthritis Mother     Osteoarthritis Mother     Rashes/Skin Problems Mother     Hypertension Father     Seizures Brother     Breast Cancer Maternal Grandmother     Osteoarthritis Maternal Grandmother     Hypertension Maternal Grandmother     High Cholesterol Maternal Grandmother     Rheum Arthritis Maternal Grandfather     Hypertension Maternal Grandfather     Rheum Arthritis Paternal Grandmother     Osteoarthritis Paternal Grandmother     Hypertension Paternal Grandmother     Rashes/Skin Problems Paternal Grandmother     No Known Problems Sister     No Known Problems Brother     No Known Problems Brother     No Known Problems Sister     No Known Problems Sister         SOCIAL HISTORY       Social History     Socioeconomic History    Marital status: Single     Spouse name: None    Number of children: 0    Years of education: 11    Highest education level: None   Occupational History    Occupation: unemployed     Comment: looking for work   Tobacco Use    Smoking status: Never    Smokeless tobacco: Never   Vaping Use    Vaping Use: Former   Substance and Sexual Activity    Alcohol use: Yes     Comment: occ, patient sates \"A lot of caffeine\"    Drug use: Yes     Frequency: 7.0 times per week     Types: Marijuana (Weed)     Comment: smokes 1-2 daily to help with RLS    Sexual activity: Yes     Partners: Female     Birth control/protection: Condom   Social History Narrative    Lives with mom and step dad    Trying to get a job       PHYSICAL EXAM       ED Triage Vitals   BP Temp Temp src Pulse Resp SpO2 Height Weight   -- -- -- -- -- -- -- --         Physical Exam  Vitals and nursing note reviewed. Constitutional:       Appearance: He is not toxic-appearing. HENT:      Head: Normocephalic. Eyes:      Extraocular Movements: Extraocular movements intact. Conjunctiva/sclera: Conjunctivae normal.      Pupils: Pupils are equal, round, and reactive to light.    Cardiovascular: Rate and Rhythm: Normal rate. Comments: Normal peripheral perfusion  Pulmonary:      Effort: Pulmonary effort is normal. No respiratory distress. Abdominal:      General: Abdomen is flat. Musculoskeletal:         General: Normal range of motion. Cervical back: Normal range of motion. Skin:     General: Skin is warm and dry. Neurological:      General: No focal deficit present. Mental Status: He is alert.    Psychiatric:         Behavior: Behavior normal.      Comments: Agitated but cooperative       DIAGNOSTIC RESULTS     EKG: All EKG's are interpreted by me in the absence of a cardiologist.      RADIOLOGY:   Interpretation per the Radiologist below, if available at the time of this note:    No orders to display       LABS:  Results for orders placed or performed during the hospital encounter of 07/19/22   CBC with Auto Differential   Result Value Ref Range    WBC 9.5 4.0 - 10.5 K/CU MM    RBC 5.05 4.6 - 6.2 M/CU MM    Hemoglobin 15.9 13.5 - 18.0 GM/DL    Hematocrit 45.7 42 - 52 %    MCV 90.5 78 - 100 FL    MCH 31.5 (H) 27 - 31 PG    MCHC 34.8 32.0 - 36.0 %    RDW 11.7 11.7 - 14.9 %    Platelets 117 060 - 545 K/CU MM    MPV 9.9 7.5 - 11.1 FL    Differential Type AUTOMATED DIFFERENTIAL     Segs Relative 77.6 (H) 36 - 66 %    Lymphocytes % 14.6 (L) 24 - 44 %    Monocytes % 7.3 (H) 0 - 4 %    Eosinophils % 0.1 0 - 3 %    Basophils % 0.2 0 - 1 %    Segs Absolute 7.3 K/CU MM    Lymphocytes Absolute 1.4 K/CU MM    Monocytes Absolute 0.7 K/CU MM    Eosinophils Absolute 0.0 K/CU MM    Basophils Absolute 0.0 K/CU MM    Nucleated RBC % 0.0 %    Total Nucleated RBC 0.0 K/CU MM    TRC 0.0480 K/CU MM    Total Immature Neutrophil 0.02 K/CU MM    Immature Neutrophil % 0.2 0 - 0.43 %   Basic Metabolic Panel w/ Reflex to MG   Result Value Ref Range    Sodium 142 135 - 145 MMOL/L    Potassium 4.1 3.5 - 5.1 MMOL/L    Chloride 106 99 - 110 mMol/L    CO2 23 21 - 32 MMOL/L    Anion Gap 13 4 - 16    BUN 15 6 - 23 MG/DL    CREATININE 0.7 (L) 0.9 - 1.3 MG/DL    Glucose 103 (H) 70 - 99 MG/DL    Calcium 9.9 8.3 - 10.6 MG/DL    GFR Non-African American >60 >60 mL/min/1.73m2    GFR African American >60 >60 mL/min/1.73m2   Urinalysis   Result Value Ref Range    Color, UA YELLOW YELLOW    Clarity, UA CLEAR CLEAR    Glucose, Urine NEGATIVE NEGATIVE MG/DL    Bilirubin Urine NEGATIVE NEGATIVE MG/DL    Ketones, Urine SMALL (A) NEGATIVE MG/DL    Specific Gravity, UA 1.025 1.001 - 1.035    Blood, Urine NEGATIVE NEGATIVE    pH, Urine 6.0 5.0 - 8.0    Protein, UA 30 (A) NEGATIVE MG/DL    Urobilinogen, Urine 0.2 0.2 - 1.0 MG/DL    Nitrite Urine, Quantitative NEGATIVE NEGATIVE    Leukocyte Esterase, Urine NEGATIVE NEGATIVE    RBC, UA 1 0 - 3 /HPF    WBC, UA 1 0 - 2 /HPF    Bacteria, UA NEGATIVE NEGATIVE /HPF    Mucus, UA FEW (A) NEGATIVE HPF    Trichomonas, UA NONE SEEN NONE SEEN /HPF   Drug screen multi urine   Result Value Ref Range    Cannabinoid Scrn, Ur UNCONFIRMED POSITIVE (A) NEGATIVE    Amphetamines NEGATIVE NEGATIVE    Cocaine Metabolite NEGATIVE NEGATIVE    Benzodiazepine Screen, Urine NEGATIVE NEGATIVE    Barbiturate Screen, Ur NEGATIVE NEGATIVE    Opiates, Urine NEGATIVE NEGATIVE    Phencyclidine, Urine NEGATIVE NEGATIVE    Oxycodone NEGATIVE NEGATIVE   Ethanol   Result Value Ref Range    Alcohol Scrn <0.01 <9.98 %WT/VOL   Salicylate   Result Value Ref Range    Salicylate Lvl <2.0 (L) 15 - 30 MG/DL    DOSE AMOUNT DOSE AMT. GIVEN - UNKNOWN     DOSE TIME DOSE TIME GIVEN - UNKNOWN    Acetaminophen Level   Result Value Ref Range    Acetaminophen Level <5.0 (L) 15 - 30 ug/ml    DOSE AMOUNT DOSE AMT.  GIVEN - UNKNOWN     DOSE TIME DOSE TIME GIVEN - UNKNOWN         EMERGENCY DEPARTMENT COURSE        DIFFERENTIAL DIAGNOSIS/MDM:   Vitals:    Vitals:    07/19/22 1547   BP: 113/62   Pulse: 95   Resp: 18   Temp: 98.3 °F (36.8 °C)   TempSrc: Oral   SpO2: 96%   Weight: 180 lb (81.6 kg)   Height: 5' 9\" (1.753 m)       MDM  Number of Diagnoses or Management Options  Adjustment disorder with anxious mood  Diagnosis management comments: 66-year-old male presenting for concerns of suicidal ideation. Patient is denying any suicidal ideation or intent on arrival.  Reports that he was upset but did not want to actually take his life. He is otherwise without acute complaint. Psychiatry has evaluated the patient and determined that he does not require inpatient admission at this time. On reexamination patient is still denying any suicidal, homicidal ideation. Denied any auditory or visual hallucinations. I feel that he is safe for discharge at this time. CONSULTS:  IP CONSULT TO PSYCHIATRY  IP CONSULT TO PSYCHOLOGY  IP CONSULT TO PSYCHIATRY    PROCEDURES:  Unless otherwise noted below, none. Procedures      FINAL IMPRESSION      1. Adjustment disorder with anxious mood          PATIENT REFERRED TO:  [address]  [address] [place] [address] [address] [facility]  [phone]          DISCHARGE MEDICATIONS:  Discharge Medication List as of 7/19/2022  5:19 PM        Controlled Substances Monitoring:     No flowsheet data found.     (Please note that portions of this note were completed with a voice recognition program.  Efforts were made to edit the dictations but occasionally words are mis-transcribed.)    Andra Clements MD (electronically signed)  Attending Emergency Physician            Andra Clements MD  07/19/22 5836

## 2022-07-19 NOTE — DISCHARGE INSTRUCTIONS
Return the emergency department for any thoughts of hurting yourself or others, or any further concerns.

## 2022-07-19 NOTE — ED NOTES
Discharged instruction reviewed with patient. All questions addressed. Patient alert and oriented x4 at discharge. Patient verbalized understanding.        Tia Miles RN  07/19/22 1940

## 2022-07-19 NOTE — CONSULTS
Sury Cruz  [id number]  7/19/2022 07/19/22      ID: Patient is a 24 y.o. male    CC: I got frustrated. ... I cut myself for stress it makes me feel better I know its bad it just helps    HPI:  Pt is a 19yo  male who presents for exacerbation of adjustment D/O with anxiety. Pt noted recent exacarbation of mood and anxiety. Pt noted he currently feels safe and comfortable on the unit. Pt was in agreement with treatment team.  Pt was polite and cordial during the interview process. Pt noted he is doing \"much better now. \"  Pt noted he is sleeping \"okay. ..about 6 hours last night. \"  Pt noted his apptetite is \"better. \"  Pt rated his depresssion a \"3,\" on a scale of zero to ten with ten being the worst and zero being none. Pt rated his anxiety a \"3,\" on the same scale. Pt denied any thoughts to harm  himself or anyone else. Pt denied any auditory or visiual hallucintations. Pt noted recent exacerbation of stress and feels he needs out pt mental health follow up.     Pt denied any hx of seizures, TBIs, Hep C or HIV  No TD noted, AIMS=0,     Pt noted hx of previous inpt psychiatric admissions  Pt denied any previous suicide attempts  Pt denied any family completion of suicide  Pt denied any family mental health hx    Pt denied any hx of abuse trauma or neglect, physical sexual or emotional.      Alcohol: denies any current  Street drugs: marijuana regularly  Tobacco: occasionally  Caffeine: 2-3 per day      Past Psychiatric History:   See note above     Family Psychiatric History:   Family History   Problem Relation Age of Onset    Rheum Arthritis Mother     Osteoarthritis Mother     Rashes/Skin Problems Mother     Hypertension Father     Seizures Brother     Breast Cancer Maternal Grandmother     Osteoarthritis Maternal Grandmother     Hypertension Maternal Grandmother     High Cholesterol Maternal Grandmother     Rheum Arthritis Maternal Grandfather     Hypertension Maternal Grandfather Rheum Arthritis Paternal Grandmother     Osteoarthritis Paternal Grandmother     Hypertension Paternal Grandmother     Rashes/Skin Problems Paternal Grandmother     No Known Problems Sister     No Known Problems Brother     No Known Problems Brother     No Known Problems Sister     No Known Problems Sister         Allergies:  No Known Allergies     OBJECTIVE  Vital Signs:  Vitals:    07/19/22 1547   BP: 113/62   Pulse: 95   Resp: 18   Temp: 98.3 °F (36.8 °C)   SpO2: 96%       Labs:  Recent Results (from the past 48 hour(s))   CBC with Auto Differential    Collection Time: 07/19/22  2:48 PM   Result Value Ref Range    WBC 9.5 4.0 - 10.5 K/CU MM    RBC 5.05 4.6 - 6.2 M/CU MM    Hemoglobin 15.9 13.5 - 18.0 GM/DL    Hematocrit 45.7 42 - 52 %    MCV 90.5 78 - 100 FL    MCH 31.5 (H) 27 - 31 PG    MCHC 34.8 32.0 - 36.0 %    RDW 11.7 11.7 - 14.9 %    Platelets 306 939 - 346 K/CU MM    MPV 9.9 7.5 - 11.1 FL    Differential Type AUTOMATED DIFFERENTIAL     Segs Relative 77.6 (H) 36 - 66 %    Lymphocytes % 14.6 (L) 24 - 44 %    Monocytes % 7.3 (H) 0 - 4 %    Eosinophils % 0.1 0 - 3 %    Basophils % 0.2 0 - 1 %    Segs Absolute 7.3 K/CU MM    Lymphocytes Absolute 1.4 K/CU MM    Monocytes Absolute 0.7 K/CU MM    Eosinophils Absolute 0.0 K/CU MM    Basophils Absolute 0.0 K/CU MM    Nucleated RBC % 0.0 %    Total Nucleated RBC 0.0 K/CU MM    TRC 0.0480 K/CU MM    Total Immature Neutrophil 0.02 K/CU MM    Immature Neutrophil % 0.2 0 - 0.43 %   Basic Metabolic Panel w/ Reflex to MG    Collection Time: 07/19/22  2:48 PM   Result Value Ref Range    Sodium 142 135 - 145 MMOL/L    Potassium 4.1 3.5 - 5.1 MMOL/L    Chloride 106 99 - 110 mMol/L    CO2 23 21 - 32 MMOL/L    Anion Gap 13 4 - 16    BUN 15 6 - 23 MG/DL    CREATININE 0.7 (L) 0.9 - 1.3 MG/DL    Glucose 103 (H) 70 - 99 MG/DL    Calcium 9.9 8.3 - 10.6 MG/DL    GFR Non-African American >60 >60 mL/min/1.73m2    GFR African American >60 >60 mL/min/1.73m2   Ethanol    Collection Time: 07/19/22  2:48 PM   Result Value Ref Range    Alcohol Scrn <0.01 <0.10 %WT/VOL   Salicylate    Collection Time: 07/19/22  2:48 PM   Result Value Ref Range    Salicylate Lvl <3.3 (L) 15 - 30 MG/DL    DOSE AMOUNT DOSE AMT. GIVEN - UNKNOWN     DOSE TIME DOSE TIME GIVEN - UNKNOWN    Acetaminophen Level    Collection Time: 07/19/22  2:48 PM   Result Value Ref Range    Acetaminophen Level <5.0 (L) 15 - 30 ug/ml    DOSE AMOUNT DOSE AMT. GIVEN - UNKNOWN     DOSE TIME DOSE TIME GIVEN - UNKNOWN    Urinalysis    Collection Time: 07/19/22  3:00 PM   Result Value Ref Range    Color, UA YELLOW YELLOW    Clarity, UA CLEAR CLEAR    Glucose, Urine NEGATIVE NEGATIVE MG/DL    Bilirubin Urine NEGATIVE NEGATIVE MG/DL    Ketones, Urine SMALL (A) NEGATIVE MG/DL    Specific Gravity, UA 1.025 1.001 - 1.035    Blood, Urine NEGATIVE NEGATIVE    pH, Urine 6.0 5.0 - 8.0    Protein, UA 30 (A) NEGATIVE MG/DL    Urobilinogen, Urine 0.2 0.2 - 1.0 MG/DL    Nitrite Urine, Quantitative NEGATIVE NEGATIVE    Leukocyte Esterase, Urine NEGATIVE NEGATIVE    RBC, UA 1 0 - 3 /HPF    WBC, UA 1 0 - 2 /HPF    Bacteria, UA NEGATIVE NEGATIVE /HPF    Mucus, UA FEW (A) NEGATIVE HPF    Trichomonas, UA NONE SEEN NONE SEEN /HPF   Drug screen multi urine    Collection Time: 07/19/22  3:00 PM   Result Value Ref Range    Cannabinoid Scrn, Ur UNCONFIRMED POSITIVE (A) NEGATIVE    Amphetamines NEGATIVE NEGATIVE    Cocaine Metabolite NEGATIVE NEGATIVE    Benzodiazepine Screen, Urine NEGATIVE NEGATIVE    Barbiturate Screen, Ur NEGATIVE NEGATIVE    Opiates, Urine NEGATIVE NEGATIVE    Phencyclidine, Urine NEGATIVE NEGATIVE    Oxycodone NEGATIVE NEGATIVE            Allergies:  No Known Allergies     OBJECTIVE  Vital Signs:        Review of Systems:  Reports of no current cardiovascular, respiratory, gastrointestinal, genitourinary, integumentary, neurological, muscuoskeletal, or immunological symptoms today. PSYCHIATRIC: See HPI above.   Review of Systems:  Reports of no current cardiovascular, respiratory, gastrointestinal, genitourinary, integumentary, neurological, muscuoskeletal, or immunological symptoms today. PSYCHIATRIC: See HPI above. Neurologic examination:  Mental status: The patient is alert, attentive, and oriented. Speech is clear and fluent with good repetition, comprehension, and naming. She recalls 3/3 objects at 5 minutes. PSYCHIATRIC EXAMINATION / MENTAL STATUS EXAM         General appearance: [x] appears age, []  appears older than stated age,               [x]  adequately dressed and groomed, [] disheveled,               [x]  in no acute distress, [] appears mildly distressed, [] other           MUSCULOSKELETAL:   Gait:   [] normal, [] antalgic, [] unsteady, [x] gait not evaluated   Station:             [] erect, [] sitting, [x] recumbent, [] other        Strength/tone:  [x] muscle strength and tone appear consistent with age and                                        condition     [] atrophy      [] abnormal movements  PSYCHIATRIC:    Appearance: appears stated age. alert and oriented to person, place, time & situation. no acute distress. Adequate grooming and hygeine. Good eye contact. No prominent physical abnormalities. Attitude: Manner is cooperative and pleasant  Motor: No psychomotor agitation, retardation or abnormal movements noted  Speech: Clearly articulated; normal rate, volume, tone & amount. Language: intact understanding and production  Mood: okay  Affect: mood congruent  Thought Production: Spontaneous. Thought Form: Coherent, linear, logical & goal-directed. No tangentiality or circumstantiality. No flight of ideas or loosening of associations. Thought Content/Perceptions: No SI or HI, no AVH, no delusion  Insight: questionable  Judgment questionable  Memory: Immediate, recent, and remote appear intact, though not formally tested.   Attention: maintained throughout interview  Fund of knowledge: Average  Gait/Balance: WNL/WNL           Impression:   Adjustment D/O with anxiety    Problem List:   <principal problem not specified>    Pt does not requires inpt psychiatric admission,pt clear for D/C once medically cleared, pt does not reqire a sitter. Plan:  1. Reviewed treatment plan with patient including medication risks, benefits, side effects. Obtained informed consent for treatment. 2. Psychiatric management:medication initiation and titration, coordinate out pt mental health follow up, safe and theraputic environment. 3. Status of problem/condition: ?pending  4. Medical co-morbidities: Management per [facility] group, appreciate assistance  5. Legal Status: voluntary  6. The treatment team reviewed with the patient the diagnosis and treatment recommendations to include the risks, benefits, and side effects of chosen medications. 7. The patient verbalized understanding and agreed with the treatment regimen as outlined above. 8. Medical records, Labs, Diagnotic tests reviewed  9. Interval History. 10. Review current labs  11. Continue current medications  12. Supportive Therapy Provided  13. Pt had an opportunity to ask questions and address concerns  14. Pt encouraged to continue outpt  Therapy. 15. Pt was in agreement with treatment plan. 16. The risks benefits and side effects of medications were discussed with the patient, including alternatives and no treatment.

## 2022-07-19 NOTE — ED TRIAGE NOTES
Patient arrived to ED via EMS and police escort. Patient sent his mother text messages of cuts on his forearm that the patient inflicted. Patient also stated in the text message to his mother that he wanted to end his life. Patient currently very agitated and wanting to leave the hospital. Patient denies anything thoughts of suicide.